# Patient Record
Sex: MALE | Race: WHITE | URBAN - METROPOLITAN AREA
[De-identification: names, ages, dates, MRNs, and addresses within clinical notes are randomized per-mention and may not be internally consistent; named-entity substitution may affect disease eponyms.]

---

## 2018-01-01 ENCOUNTER — DISCHARGE SUMMARY NURSING HOME (OUTPATIENT)
Dept: GERIATRICS | Facility: CLINIC | Age: 83
End: 2018-01-01
Payer: MEDICARE

## 2018-01-01 ENCOUNTER — TRANSFERRED RECORDS (OUTPATIENT)
Dept: HEALTH INFORMATION MANAGEMENT | Facility: CLINIC | Age: 83
End: 2018-01-01

## 2018-01-01 ENCOUNTER — NURSING HOME VISIT (OUTPATIENT)
Dept: GERIATRICS | Facility: CLINIC | Age: 83
End: 2018-01-01
Payer: MEDICARE

## 2018-01-01 VITALS
WEIGHT: 139.6 LBS | SYSTOLIC BLOOD PRESSURE: 94 MMHG | TEMPERATURE: 97.1 F | BODY MASS INDEX: 21.23 KG/M2 | OXYGEN SATURATION: 98 % | DIASTOLIC BLOOD PRESSURE: 56 MMHG | HEART RATE: 82 BPM

## 2018-01-01 VITALS
HEIGHT: 68 IN | HEART RATE: 78 BPM | DIASTOLIC BLOOD PRESSURE: 70 MMHG | OXYGEN SATURATION: 96 % | WEIGHT: 135.6 LBS | RESPIRATION RATE: 24 BRPM | BODY MASS INDEX: 20.55 KG/M2 | SYSTOLIC BLOOD PRESSURE: 109 MMHG | TEMPERATURE: 95.9 F

## 2018-01-01 VITALS
OXYGEN SATURATION: 98 % | TEMPERATURE: 97.1 F | WEIGHT: 138.8 LBS | SYSTOLIC BLOOD PRESSURE: 94 MMHG | HEART RATE: 82 BPM | DIASTOLIC BLOOD PRESSURE: 56 MMHG | BODY MASS INDEX: 21.1 KG/M2

## 2018-01-01 VITALS
TEMPERATURE: 97.6 F | BODY MASS INDEX: 19.73 KG/M2 | HEIGHT: 68 IN | WEIGHT: 130.2 LBS | RESPIRATION RATE: 18 BRPM | OXYGEN SATURATION: 95 % | DIASTOLIC BLOOD PRESSURE: 66 MMHG | SYSTOLIC BLOOD PRESSURE: 116 MMHG | HEART RATE: 89 BPM

## 2018-01-01 VITALS
DIASTOLIC BLOOD PRESSURE: 52 MMHG | SYSTOLIC BLOOD PRESSURE: 104 MMHG | TEMPERATURE: 97 F | RESPIRATION RATE: 24 BRPM | WEIGHT: 142 LBS | HEIGHT: 68 IN | HEART RATE: 83 BPM | BODY MASS INDEX: 21.52 KG/M2 | OXYGEN SATURATION: 97 %

## 2018-01-01 VITALS
DIASTOLIC BLOOD PRESSURE: 64 MMHG | OXYGEN SATURATION: 98 % | HEART RATE: 91 BPM | SYSTOLIC BLOOD PRESSURE: 112 MMHG | WEIGHT: 134.6 LBS | RESPIRATION RATE: 20 BRPM | BODY MASS INDEX: 20.47 KG/M2 | TEMPERATURE: 97.6 F

## 2018-01-01 VITALS
WEIGHT: 138 LBS | HEIGHT: 68 IN | BODY MASS INDEX: 20.92 KG/M2 | RESPIRATION RATE: 30 BRPM | TEMPERATURE: 96.1 F | DIASTOLIC BLOOD PRESSURE: 69 MMHG | OXYGEN SATURATION: 98 % | HEART RATE: 86 BPM | SYSTOLIC BLOOD PRESSURE: 112 MMHG

## 2018-01-01 VITALS
WEIGHT: 138.4 LBS | OXYGEN SATURATION: 96 % | HEIGHT: 68 IN | DIASTOLIC BLOOD PRESSURE: 70 MMHG | HEART RATE: 78 BPM | BODY MASS INDEX: 20.98 KG/M2 | SYSTOLIC BLOOD PRESSURE: 109 MMHG | TEMPERATURE: 95.9 F | RESPIRATION RATE: 24 BRPM

## 2018-01-01 VITALS
RESPIRATION RATE: 18 BRPM | BODY MASS INDEX: 20.98 KG/M2 | DIASTOLIC BLOOD PRESSURE: 60 MMHG | TEMPERATURE: 95.9 F | OXYGEN SATURATION: 97 % | HEIGHT: 68 IN | WEIGHT: 138.4 LBS | HEART RATE: 84 BPM | SYSTOLIC BLOOD PRESSURE: 102 MMHG

## 2018-01-01 DIAGNOSIS — I50.23 ACUTE ON CHRONIC SYSTOLIC CONGESTIVE HEART FAILURE (H): ICD-10-CM

## 2018-01-01 DIAGNOSIS — I48.0 PAROXYSMAL ATRIAL FIBRILLATION (H): ICD-10-CM

## 2018-01-01 DIAGNOSIS — Z99.2 ESRD (END STAGE RENAL DISEASE) ON DIALYSIS (H): ICD-10-CM

## 2018-01-01 DIAGNOSIS — R19.7 DIARRHEA, UNSPECIFIED TYPE: ICD-10-CM

## 2018-01-01 DIAGNOSIS — J43.9 PULMONARY EMPHYSEMA, UNSPECIFIED EMPHYSEMA TYPE (H): ICD-10-CM

## 2018-01-01 DIAGNOSIS — I10 BENIGN ESSENTIAL HTN: ICD-10-CM

## 2018-01-01 DIAGNOSIS — B33.8 RSV (RESPIRATORY SYNCYTIAL VIRUS INFECTION): ICD-10-CM

## 2018-01-01 DIAGNOSIS — I48.91 ATRIAL FIBRILLATION AND FLUTTER (H): ICD-10-CM

## 2018-01-01 DIAGNOSIS — B33.8 RSV INFECTION: ICD-10-CM

## 2018-01-01 DIAGNOSIS — M45.0 ANKYLOSING SPONDYLITIS OF MULTIPLE SITES IN SPINE (H): ICD-10-CM

## 2018-01-01 DIAGNOSIS — N18.6 ESRD (END STAGE RENAL DISEASE) ON DIALYSIS (H): ICD-10-CM

## 2018-01-01 DIAGNOSIS — I48.0 PAROXYSMAL ATRIAL FIBRILLATION (H): Primary | ICD-10-CM

## 2018-01-01 DIAGNOSIS — Z79.01 ENCOUNTER FOR MONITORING COUMADIN THERAPY: ICD-10-CM

## 2018-01-01 DIAGNOSIS — J18.9 PNEUMONIA OF RIGHT LOWER LOBE DUE TO INFECTIOUS ORGANISM: Primary | ICD-10-CM
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
## 2018-01-01 DIAGNOSIS — J96.01 ACUTE RESPIRATORY FAILURE WITH HYPOXIA (H): Primary | ICD-10-CM

## 2018-01-01 DIAGNOSIS — I10 ESSENTIAL HYPERTENSION: ICD-10-CM

## 2018-01-01 DIAGNOSIS — J44.1 COPD EXACERBATION (H): ICD-10-CM

## 2018-01-01 DIAGNOSIS — I73.9 PVD (PERIPHERAL VASCULAR DISEASE) (H): ICD-10-CM

## 2018-01-01 DIAGNOSIS — Z79.01 CHRONIC ANTICOAGULATION: ICD-10-CM

## 2018-01-01 DIAGNOSIS — R41.0 DELIRIUM: ICD-10-CM

## 2018-01-01 DIAGNOSIS — J18.9 PNEUMONIA OF RIGHT LOWER LOBE DUE TO INFECTIOUS ORGANISM: ICD-10-CM

## 2018-01-01 DIAGNOSIS — Z51.81 ENCOUNTER FOR MONITORING COUMADIN THERAPY: ICD-10-CM

## 2018-01-01 DIAGNOSIS — J96.01 ACUTE RESPIRATORY FAILURE WITH HYPOXIA (H): ICD-10-CM

## 2018-01-01 DIAGNOSIS — R53.81 PHYSICAL DECONDITIONING: ICD-10-CM

## 2018-01-01 DIAGNOSIS — T17.900D SILENT ASPIRATION, SUBSEQUENT ENCOUNTER: ICD-10-CM

## 2018-01-01 DIAGNOSIS — I50.22 CHRONIC SYSTOLIC CONGESTIVE HEART FAILURE (H): ICD-10-CM

## 2018-01-01 DIAGNOSIS — I48.92 ATRIAL FIBRILLATION AND FLUTTER (H): ICD-10-CM

## 2018-01-01 DIAGNOSIS — Z71.89 ADVANCED DIRECTIVES, COUNSELING/DISCUSSION: ICD-10-CM

## 2018-01-01 DIAGNOSIS — R53.81 PHYSICAL DECONDITIONING: Primary | ICD-10-CM

## 2018-01-01 LAB
ANION GAP SERPL CALCULATED.3IONS-SCNC: 11 MMOL/L (ref 5–15)
BUN SERPL-MCNC: 66 MG/DL (ref 8–26)
CALCIUM SERPL-MCNC: 9.5 MG/DL (ref 8.5–10.1)
CHLORIDE SERPLBLD-SCNC: 99 MMOL/L (ref 98–107)
CO2 SERPL-SCNC: 24 MMOL/L (ref 21–32)
CREAT SERPL-MCNC: 5.4 MG/DL (ref 0.7–1.2)
DIFFERENTIAL: ABNORMAL
ERYTHROCYTE [DISTWIDTH] IN BLOOD BY AUTOMATED COUNT: 18.2 % (ref 11.5–14.5)
GFR SERPL CREATININE-BSD FRML MDRD: 10 ML/MIN/1.73M2
GLUCOSE SERPL-MCNC: 98 MG/DL (ref 70–100)
HCT VFR BLD AUTO: 38.7 % (ref 41–54)
HEMOGLOBIN: 12.8 G/DL (ref 13.5–17.9)
MCH RBC QN AUTO: 32.2 PG (ref 27–33)
MCHC RBC AUTO-ENTMCNC: 33.1 G/DL (ref 32–37.5)
MCV RBC AUTO: 97.2 FL (ref 80–100)
PLATELET # BLD AUTO: 116 K/CMM (ref 150–400)
POTASSIUM SERPL-SCNC: 5.8 MMOL/L (ref 3.5–5)
RBC # BLD AUTO: 3.98 M/CMM (ref 4.6–6.2)
SODIUM SERPL-SCNC: 134 MMOL/L (ref 136–145)
WBC # BLD AUTO: 11.45 K/CMM (ref 4–11)

## 2018-01-01 PROCEDURE — 99308 SBSQ NF CARE LOW MDM 20: CPT | Performed by: NURSE PRACTITIONER

## 2018-01-01 PROCEDURE — 99310 SBSQ NF CARE HIGH MDM 45: CPT | Performed by: NURSE PRACTITIONER

## 2018-01-01 PROCEDURE — 99316 NF DSCHRG MGMT 30 MIN+: CPT | Performed by: NURSE PRACTITIONER

## 2018-01-01 PROCEDURE — 99309 SBSQ NF CARE MODERATE MDM 30: CPT | Performed by: NURSE PRACTITIONER

## 2018-01-01 PROCEDURE — 99306 1ST NF CARE HIGH MDM 50: CPT | Performed by: INTERNAL MEDICINE

## 2018-01-01 RX ORDER — POLYETHYLENE GLYCOL 3350 17 G/17G
1 POWDER, FOR SOLUTION ORAL DAILY PRN
COMMUNITY
End: 2018-01-01

## 2018-01-01 RX ORDER — LOPERAMIDE HCL 2 MG
2 CAPSULE ORAL 4 TIMES DAILY PRN
COMMUNITY
End: 2018-01-01

## 2018-01-01 RX ORDER — FLUTICASONE PROPIONATE 50 MCG
1 SPRAY, SUSPENSION (ML) NASAL DAILY
COMMUNITY

## 2018-01-01 RX ORDER — SEVELAMER CARBONATE 800 MG/1
1600 TABLET, FILM COATED ORAL
COMMUNITY

## 2018-01-01 RX ORDER — ALBUTEROL SULFATE 0.83 MG/ML
1 SOLUTION RESPIRATORY (INHALATION) 3 TIMES DAILY
COMMUNITY
End: 2018-01-01

## 2018-01-01 RX ORDER — ALBUTEROL SULFATE 90 UG/1
2 AEROSOL, METERED RESPIRATORY (INHALATION) EVERY 4 HOURS PRN
COMMUNITY
End: 2018-01-01

## 2018-01-01 RX ORDER — IPRATROPIUM BROMIDE AND ALBUTEROL SULFATE 2.5; .5 MG/3ML; MG/3ML
1 SOLUTION RESPIRATORY (INHALATION) EVERY 4 HOURS PRN
COMMUNITY
End: 2018-01-01

## 2018-02-14 PROBLEM — I50.23 ACUTE ON CHRONIC SYSTOLIC CONGESTIVE HEART FAILURE (H): Status: ACTIVE | Noted: 2018-01-01

## 2018-02-14 PROBLEM — Z71.89 ADVANCED DIRECTIVES, COUNSELING/DISCUSSION: Status: ACTIVE | Noted: 2018-01-01

## 2018-02-14 PROBLEM — I48.0 PAROXYSMAL ATRIAL FIBRILLATION (H): Status: ACTIVE | Noted: 2018-01-01

## 2018-02-14 PROBLEM — Z99.2 ESRD (END STAGE RENAL DISEASE) ON DIALYSIS (H): Status: ACTIVE | Noted: 2018-01-01
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
## 2018-02-14 PROBLEM — R41.0 DELIRIUM: Status: ACTIVE | Noted: 2018-01-01

## 2018-02-14 PROBLEM — R19.7 DIARRHEA: Status: ACTIVE | Noted: 2018-01-01

## 2018-02-14 PROBLEM — R53.81 PHYSICAL DECONDITIONING: Status: ACTIVE | Noted: 2018-01-01

## 2018-02-14 PROBLEM — J96.01 ACUTE RESPIRATORY FAILURE WITH HYPOXIA (H): Status: ACTIVE | Noted: 2018-01-01

## 2018-02-14 PROBLEM — J44.1 COPD EXACERBATION (H): Status: ACTIVE | Noted: 2018-01-01

## 2018-02-14 PROBLEM — B33.8 RSV (RESPIRATORY SYNCYTIAL VIRUS INFECTION): Status: ACTIVE | Noted: 2018-01-01

## 2018-02-14 PROBLEM — J18.9 PNEUMONIA OF RIGHT LOWER LOBE DUE TO INFECTIOUS ORGANISM: Status: ACTIVE | Noted: 2018-01-01

## 2018-02-14 PROBLEM — N18.6 ESRD (END STAGE RENAL DISEASE) ON DIALYSIS (H): Status: ACTIVE | Noted: 2018-01-01

## 2018-02-14 NOTE — PROGRESS NOTES
[place] [facility]  PRIMARY CARE PROVIDER AND CLINIC:  [facility]  Chief Complaint   Patient presents with     Hospital F/U       HPI:    Amado Galarza is a 82 year old  (1935),admitted to the [facility] from [facility].  Hospital stay 02/01/2018 through 02/13/2018.  Admitted to this facility for  rehab, medical management and nursing care.  HPI information obtained from: facility chart records, facility staff, patient report, family/first contact friend Arcelia Solison report and records from [facility].    He has a complex medical history including ischemic cardiomyopathy, history of CABG, CHF with EF 15-20%, ESRD on dialysis, COPD, HTN and was hospitalized with worsening shortness of breath and productive cough. He was positive for RSV. CT showed RLL consolidation and bilateral pleural effusions, R>L,  small to moderate abdominal ascites. He was treated with zosyn and levaquin, completed inpatient. Received 5 day course of prednisone for COPD exacerbation. CHF exacerbation was managed with dialysis. LE edema and dyspnea improved.  He developed diarrhea,which improved with psyllium.  Unclear if he was tested for C diff. He had acute delirium with hallucinations, improved with trazodone at HS.     Current issues are:         Acute respiratory failure with hypoxia (H)-reports fatigue and shortness of breath with activity, worse than usual. Continues with occasional dry cough, which is improving. Afebrile. O2 sats >90% on room air.   Pneumonia of right lower lobe due to infectious organism (H)  COPD exacerbation (H)  RSV (respiratory syncytial virus infection)  Acute on chronic systolic congestive heart failure (H)-LE edema improved. Friend reports he is scheduled for pacemaker placement next month, but expects this to be postponed.   Paroxysmal atrial fibrillation (H)-HR: 71-89. INR 2.5 today. Coumadin 1 mg was ordered for last night but not given, apparently because patient  was sleeping.   ESRD (end stage renal disease) on dialysis (H)-has been on dialysis since 2016.   PVD (peripheral vascular disease) (H)-hospitalized last month with LLE cellulitis, resolved.   Ankylosing spondylitis of multiple sites in spine (H)-friend reports history of chronic neck and back pain, has had fusion in the past. Patient denies pain at present.   Benign essential HTN-BPs:  107/66, 116/66, 111/67  Diarrhea, unspecified type-denies ongoing diarrhea  Fall, subsequent encounter-he fell prior to hospital discharge with no injury  Delirium- he initially responded to questions appropriately, but then repeatedly fell asleep and became confused. Speech difficult to understand at times.   Per friend, he is far from his baseline. He's usually cognitively intact and independent with cares, continues to drive.   Physical deconditioning-up with assist. Requires assist with all cares, including meals.     CODE STATUS/ADVANCE DIRECTIVES DISCUSSION:   DNR/DNI  Patient's living condition: lives alone in a condo. 2 children live out of state. Son in [place] is emergency contact.     ALLERGIES:Felodipine and Heparin  PAST MEDICAL HISTORY:  has a past medical history of Anemia; Ankylosing spondylitis (H); Ascites; Atrial flutter (H); Benign essential HTN; Bilateral sensorineural hearing loss; Colon polyp; Congestive heart failure (CHF) (H); COPD (chronic obstructive pulmonary disease) (H); Ectropion; ESRF (end stage renal failure) (H); Gout; Heart failure (H); Hyperlipidemia; Hyperparathyroidism (H); Iron deficiency anemia; Obesity; Osteoarthritis, knee; Peripheral vascular disease (H); Renal artery obstruction (H); and Vertigo.  PAST SURGICAL HISTORY:  has no past surgical history on file.  FAMILY HISTORY: family history is not on file.  SOCIAL HISTORY:  reports that he has quit smoking. He does not have any smokeless tobacco history on file. He reports that he does not drink alcohol or use illicit drugs.    Post Discharge  "Medication Reconciliation Status: discharge medications reconciled, continue medications without change.  Current Outpatient Prescriptions   Medication Sig Dispense Refill     albuterol (PROAIR HFA/PROVENTIL HFA/VENTOLIN HFA) 108 (90 BASE) MCG/ACT Inhaler Inhale 2 puffs into the lungs every 4 hours as needed for shortness of breath / dyspnea or wheezing       ipratropium - albuterol 0.5 mg/2.5 mg/3 mL (DUONEB) 0.5-2.5 (3) MG/3ML neb solution Take 1 vial by nebulization every 4 hours as needed for shortness of breath / dyspnea or wheezing       ALLOPURINOL PO Take 50 mg by mouth daily       cyanocobalamin 1000 MCG TABS Take 1 tablet by mouth At Bedtime       B Complex-C-Folic Acid (DIALYVITE PO) Take 1 tablet by mouth At Bedtime       fluticasone (FLONASE) 50 MCG/ACT spray Spray 1 spray into both nostrils daily       guaiFENesin (ROBITUSSIN) 20 mg/mL SOLN solution Take 200 mg by mouth every 4 hours as needed for cough       ISOSORBIDE MONONITRATE PO Take 30 mg by mouth At Bedtime       MELATONIN PO Take 3 mg by mouth At Bedtime       METOPROLOL SUCCINATE ER PO Take 12.5 mg by mouth daily       mometasone (ASMANEX) 220 MCG/INH Inhaler Inhale 2 puffs into the lungs 2 times daily       OMEPRAZOLE PO Take 20 mg by mouth daily       polyethylene glycol (MIRALAX/GLYCOLAX) powder Take 1 capful by mouth daily as needed for constipation       psyllium (METAMUCIL/KONSYL) Packet Take 1 packet by mouth daily       sevelamer (RENVELA) 800 MG tablet Take 1,600 mg by mouth 3 times daily (with meals)       TRAZODONE HCL PO Take 50 mg by mouth nightly as needed        Warfarin Sodium (COUMADIN PO) Take 1 mg by mouth daily          ROS:  Limited due to cognitive impairment. Responds to some questions appropriately, falls asleep during interview.     Exam:  /66  Pulse 89  Temp 97.6  F (36.4  C)  Resp 18  Ht 5' 8\" (1.727 m)  Wt 130 lb 3.2 oz (59.1 kg)  SpO2 95%  BMI 19.8 kg/m2  GENERAL APPEARANCE:  in no distress, " sleepy  ENT:  Mouth and posterior oropharynx normal, moist mucous membranes, [place]  EYES:  EOM normal, conjunctiva and lids normal, PERRL  NECK:  No adenopathy,masses or thyromegaly  RESP:  respiratory effort and palpation of chest normal, no respiratory distress, diminished breath sounds bilaterally, no crackles or wheezes  CV:  Palpation and auscultation of heart done , irregular rate and rhythm,  2/6 murmur, 1+ bilateral LE edema,  feet cool with purplish toes, unable to palpate pedal pulses  ABDOMEN:  normal bowel sounds, soft, nontender, no hepatosplenomegaly or other masses  M/S:   sitting up in chair. JUNG with good strength. No joint inflammation  SKIN:  toenails thick, long and discolored. No rashes or open areas. Dialysis catheter right chest site asymptomatic.   PSYCH:  oriented to self, situation, friend, insight and judgement impaired, memory impaired     Lab/Diagnostic data:  [facility] labs:  2/11/2018  Glucose 99  BUN  58  Creatinine 4.5  Sodium 132  Potassium 132  Chloride 95  CO2  22  Calcium 9.4  Magnesium 1.9  Anion Gap 15    2/8/2018  Sodium 134  Potassium 5.0  Chloride 97  CO2  23  BUN  39  Creatinine 3.1  Glucose 88  WBC  10.52  HGB  12.6  HCT  38.1  PLT  126    ASSESSMENT / PLAN:  (J96.01) Acute respiratory failure with hypoxia (H)  (primary encounter diagnosis)  (J18.1) Pneumonia of right lower lobe due to infectious organism (H)  (J44.1) COPD exacerbation (H)  (B97.4) RSV (respiratory syncytial virus infection)  Comment: improving slowly  Plan: continue nebs, albuterol inhaler prn, guaifenesin, mometasone. Monitor respiratory status.     (I50.23) Acute on chronic systolic congestive heart failure (H)  Comment: mild LE edema  Plan: compression stockings during the day. Daily weight. Dialysis for volume management. Cardiology follow up as scheduled.     (I48.0) Paroxysmal atrial fibrillation (H)  Comment: rate controlled. INR therapeutic. Missed dose coumadin last night  Plan: coumadin 1  mg daily. INR 2/16/2018    (N18.6,  Z99.2) ESRD (end stage renal disease) on dialysis (H)  Comment: no acute issues  [facility] Continue Renvela, dialyvite. Renal diet.     (I73.9) PVD (peripheral vascular disease) (H)  Comment: chronic. Needs nail care  Plan: refer to onsite Podiatry. If not available, [facility]iatry at [facility]    (M45.0) Ankylosing spondylitis of multiple sites in spine (H)  Comment: chronic. Pain appears controlled  Plan: tylenol prn    (I10) Benign essential HTN  Comment: controlled  Plan: continue metoprolol, isosorbide. Monitor VS    (R19.7) Diarrhea, unspecified type  Comment: appears resolved  Plan: if diarrhea recurs, obtain stool specimen for  C diff    (W19.XXXD) Fall, subsequent encounter  Comment: fell inpatient. No apparent injury  Plan: fall precautions.     (R41.0) Delirium  Comment: ongoing delirium.   Plan: SPEECH THERAPY eval and treat for swallow and cognition. Supportive care. Closely monitor mental status.     (Z71.89) Advanced directives, counseling/discussion  Comment: he has a directive on file and records indicate DNR/DNI. This is confirmed by patient and friend.   Plan: POLST completed and epic orders updated    (R53.81) Physical deconditioning  Comment: very deconditioned due to acute illness and hospitalization  Plan: PHYSICAL THERAPY/OT. Goal is to return home with services.       Electronically signed by:  TONI Mckinney CNP

## 2018-02-16 NOTE — PROGRESS NOTES
PRIMARY CARE PROVIDER AND CLINIC RESPONSIBLE:  System, Provider Not In,          ADMISSION HISTORY AND PHYSICAL EXAMINATION     Chief Complaint   Patient presents with     Fatigue         HISTORY OF PRESENT ILLNESS:  82 year old male, (1935), admitted [facility] the [facility] for continuation of medical care and rehab.  HPI information obtained from: facility chart records, facility staff, patient report, [facility] chart review and [facility] admissiona and discharge notes.    Amado Galarza is a 82 year old male with history of ankylosing spondylitis,  atrial fib/flutter s/p ablation and warfarin therapy, hypertension,  bilateral sensorineural hearing loss, colon polyp, congestive heart failure (CHF), COPD (chronic obstructive pulmonary disease), ESRD (end stage renal failure),  gout, skin cancer resected at nose, hyperlipidemia; secondary hyperparathyroidism, Iron deficiency anemia who was admitted at [facility] from 2/1/18 to 2/13/18 due to worsening dyspnea, cough. Chest x-ray showed RLL pneumonia. Influenza A and B PCR were negative but was positive RSV. He was treated with IV Levaquin and Zosyn and narrowed down to Levaquin. He was treated prednisone for COPD. His lung condition improved after run of dialysis. He had hospital acquired delirium, improved with Trazodone will helped his sleep. He has hard hearing and neck issue related to spondylitis. He has nose surgical wound related to skin cancer removed at that area for days ago. His cough decreased and no phlegm. He has usual dyspnea. He feels tired and tired. He is not confused this morning. Slept well, appetite poor and loose stool diarrhea, c difficile result pending. Patient denies chest pain, abdominal pain, n&v, fever, chills, dysuria, leg pain or swelling. The rest of ROS is unremarkable. Patient is seen and examined by me with Ajay Lemus NP. Please see ZACHARY Lemus's admit noted dated 2/14/18 for details of admission, past medical history, family  history, allergies, medication list, social history and other details pertinent with this admission. Hospital admission and dc summary reviewed.    Past Medical History:   Diagnosis Date     Anemia      Ankylosing spondylitis (H)      Ascites      Atrial flutter (H)      Benign essential HTN      Bilateral sensorineural hearing loss      Colon polyp      Congestive heart failure (CHF) (H)      COPD (chronic obstructive pulmonary disease) (H)      Ectropion      ESRF (end stage renal failure) (H)      Gout      Heart failure (H)      Hyperlipidemia      Hyperparathyroidism (H)      Iron deficiency anemia      Obesity      Osteoarthritis, knee      Peripheral vascular disease (H)      Renal artery obstruction (H)      Vertigo        No past surgical history on file.    Current Outpatient Prescriptions   Medication Sig     Olodaterol HCl 2.5 MCG/ACT AERS Inhale 2 puffs into the lungs every morning     tiotropium-olodaterol (STIOLTO RESPIMAT) 2.5-2.5 MCG/ACT AERS Inhale 2 puffs into the lungs daily     albuterol (PROAIR HFA/PROVENTIL HFA/VENTOLIN HFA) 108 (90 BASE) MCG/ACT Inhaler Inhale 2 puffs into the lungs every 4 hours as needed for shortness of breath / dyspnea or wheezing     ALLOPURINOL PO Take 50 mg by mouth daily     cyanocobalamin 1000 MCG TABS Take 1 tablet by mouth At Bedtime     B Complex-C-Folic Acid (DIALYVITE PO) Take 1 tablet by mouth At Bedtime     fluticasone (FLONASE) 50 MCG/ACT spray Spray 1 spray into both nostrils daily     guaiFENesin (ROBITUSSIN) 20 mg/mL SOLN solution Take 200 mg by mouth every 4 hours as needed for cough     ISOSORBIDE MONONITRATE PO Take 30 mg by mouth At Bedtime     MELATONIN PO Take 3 mg by mouth At Bedtime     METOPROLOL SUCCINATE ER PO Take 12.5 mg by mouth daily     mometasone (ASMANEX) 220 MCG/INH Inhaler Inhale 2 puffs into the lungs 2 times daily     OMEPRAZOLE PO Take 20 mg by mouth daily     polyethylene glycol (MIRALAX/GLYCOLAX) powder Take 1 capful by mouth daily  "as needed for constipation     psyllium (METAMUCIL/KONSYL) Packet Take 1 packet by mouth daily     sevelamer (RENVELA) 800 MG tablet Take 1,600 mg by mouth 3 times daily (with meals)     TRAZODONE HCL PO Take 50 mg by mouth nightly as needed      Warfarin Sodium (COUMADIN PO) Take 1 mg by mouth daily      No current facility-administered medications for this visit.        Allergies   Allergen Reactions     Felodipine      Heparin        Social History     Social History     Marital status: Single     Spouse name: N/A     Number of children: N/A     Years of education: N/A     Occupational History     Not on file.     Social History Main Topics     Smoking status: Former Smoker     Smokeless tobacco: Not on file      Comment: quit 40+ years ago     Alcohol use No     Drug use: No     Sexual activity: Not on file     Other Topics Concern     Not on file     Social History Narrative     No narrative on file          Information reviewed:  Medications, vital signs, orders, nursing notes, problem list, hospital information.     ROS: All 10 point review of system completed, those pertinent positive, please see H&P, the remaining ROS is negative.    /69  Pulse 86  Temp 96.1  F (35.6  C)  Resp 30  Ht 5' 8\" (1.727 m)  Wt 138 lb (62.6 kg)  SpO2 98%  BMI 20.98 kg/m2    PHYSICAL EXAMINATION:   GENERAL:  No acute distress.  SKIN:  Dry and warm.  There is small wound at nose.  HEENT:  Head without trauma.  Pupils round, reactive. Exam of conjunctiva and lids are normal. Sclera without icterus. There is no oral thrush. As above  NECK:  Supple. No jugular venous distension.  CHEST: No reproducible chest tenderness.   LUNGS:  Normal respiratory effort. Lungs reveal decreased breath sounds at bases. No rales or wheezes.  HEART:  Regular rate and rhythm.  No murmur, gallops or rubs auscultated.  ABDOMEN:  Soft, bowel sounds positive.  There is no tenderness or guarding.   EXTREMITIES: No edema.   NEUROLOGIC:  Alert and " oriented x3. Moving all ext. Gait not tested.  PSYCH: Recent and remote memory is normal. Mood and affect are normal.    Lab/Diagnostic data:  Reviewed  [facility] labs:  2/11/2018  Glucose                     99  BUN                            58  Creatinine                  4.5  Sodium                      132  Potassium                 132  Chloride                     95  CO2                          22  Calcium                     9.4  Magnesium               1.9  Anion Gap                 15     2/8/2018  Sodium                      134  Potassium                 5.0  Chloride                     97  CO2                           23  BUN                           39  Creatinine                  3.1  Glucose                     88  WBC                          10.52  HGB                            12.6  HCT                             38.1  PLT                              126    ASSESSMENT / PLAN:     Physical deconditioning  Plan: PT/OT with increase independence, self-care, strength and endurance and other cares that help return to home/facility of origin, fall precautions. Care conference with patient and family for the progress of rehab and disposition issues will be discussed as planned. Rehab evaluation and other evaluations including CPT are at rehab logs, to be reviewed separately.  Fall risk assessment as well as cognitive evaluation will be formed during rehab stay if indicated.    RSV infection  Plan: improving. Will dc aerosol isolation.    Atrial fibrillation and flutter (H)  Plan: Continue metoprolol and warfarin. Monitor INR.    Chronic systolic congestive heart failure (H), CAD and Essential hypertension  Plan: continue current medications metoprolol, imdur, monitor I&O and daily weighs and labs if indicated.    ESRD (end stage renal disease) on dialysis (H)  Plan: Continue dialysis M-W-F. Follow up nephrologist as scheduled.    Pulmonary emphysema, unspecified emphysema type (H)  Plan:  Continue tiotropium-olodaterol and Asmanex.    Diarrhea, unspecified type  Plan: Continue to monitor, await c difficile toxin.     Other problems with same care. Primary care doctor and other specialists to address those chronic problems in next clinic appointment to be scheduled upon discharge from the TCU.      Total time spent with patient visit was 37 min including patient visit, review of past records, patients counseling and coordinating care.        Hernan Delgado MD

## 2018-02-19 NOTE — PROGRESS NOTES
[facility]    Chief Complaint   Patient presents with     RECHECK       HPI:    Amado Galarza is a 82 year old  (1935), who is being seen today for an episodic care visit at [place] [facility].  HPI information obtained from: facility chart records, facility staff, patient report, family/first contact friend Arcelia Seth report and records from [facility].    He came to this facility 2/13/2018 for short term rehab and medical management following hospitalization for worsening shortness of breath and productive cough.   He has a complex medical history including ischemic cardiomyopathy, history of CABG, CHF with EF 15-20%, ESRD on dialysis, COPD, HTN.    He was positive for RSV. CT showed RLL consolidation and bilateral pleural effusions, R>L,  small to moderate abdominal ascites. He was treated with zosyn and levaquin, completed inpatient. Received 5 day course of prednisone for COPD exacerbation. CHF exacerbation was managed with dialysis. LE edema and dyspnea improved. He had acute delirium with hallucinations, improved with trazodone at HS.     Current issues are:         Pneumonia of right lower lobe due to infectious organism (H)-afebrile, no hypoxia. Reports increased coughing over the past 2 days, sometimes productive for yellow sputum. Denies shortness of breath or chest pain. Much more alert and coherent. Friend reports he is closer to his baseline cognition. Still  weak and easily fatigued.   COPD exacerbation (H)  RSV (respiratory syncytial virus infection)  Acute on chronic systolic congestive heart failure (H)-recorded weight is up 8 lbs today. LE edema has resolved. Due for dialysis today.   Paroxysmal atrial fibrillation (H)-HR: 63-96. INR 2.8 today on 2 mg daily. Last INR 1.9. No bleeding.   ESRD (end stage renal disease) on dialysis (H)-denies problems with dialysis.   PVD (peripheral vascular disease) (H)-was unable to be seen by the onsite Podiatrist for long, discolored  toenails.   Benign essential HTN-BPs: 102/60, 101/63, 141/56     Diarrhea, unspecified type-had diarrhea inpatient that improved, but has worsened since tcu admission. Had 3 BMs today. C diff obtained and is negative.  Denies nausea, vomiting or abdominal pain.   Physical deconditioning-ambulating short distances with walker and assist. Requires assist of 1 with cares.      ALLERGIES: Felodipine and Heparin  Past Medical, Surgical, Family and Social History reviewed and updated in EPIC.    Current Outpatient Prescriptions   Medication Sig Dispense Refill     albuterol (2.5 MG/3ML) 0.083% neb solution Take 1 vial by nebulization 3 times daily And every 4 hours as needed.       loperamide (IMODIUM) 2 MG capsule Take 2 mg by mouth 4 times daily as needed for diarrhea       Olodaterol HCl 2.5 MCG/ACT AERS Inhale 2 puffs into the lungs every morning       tiotropium-olodaterol (STIOLTO RESPIMAT) 2.5-2.5 MCG/ACT AERS Inhale 2 puffs into the lungs daily       ALLOPURINOL PO Take 50 mg by mouth daily       cyanocobalamin 1000 MCG TABS Take 1 tablet by mouth At Bedtime       B Complex-C-Folic Acid (DIALYVITE PO) Take 1 tablet by mouth At Bedtime       fluticasone (FLONASE) 50 MCG/ACT spray Spray 1 spray into both nostrils daily       guaiFENesin (ROBITUSSIN) 20 mg/mL SOLN solution Take 200 mg by mouth every 4 hours as needed for cough       ISOSORBIDE MONONITRATE PO Take 30 mg by mouth At Bedtime       MELATONIN PO Take 3 mg by mouth At Bedtime       METOPROLOL SUCCINATE ER PO Take 12.5 mg by mouth daily       mometasone (ASMANEX) 220 MCG/INH Inhaler Inhale 2 puffs into the lungs 2 times daily       OMEPRAZOLE PO Take 20 mg by mouth daily       psyllium (METAMUCIL/KONSYL) Packet Take 1 packet by mouth daily       sevelamer (RENVELA) 800 MG tablet Take 1,600 mg by mouth 3 times daily (with meals)       Warfarin Sodium (COUMADIN PO) Take 1-2 mg by mouth daily        albuterol (PROAIR HFA/PROVENTIL HFA/VENTOLIN HFA) 108 (90  "BASE) MCG/ACT Inhaler Inhale 2 puffs into the lungs every 4 hours as needed for shortness of breath / dyspnea or wheezing       Medications reviewed:  Medications reconciled to facility chart and changes were made to reflect current medications as identified as above med list. Below are the changes that were made:   Medications stopped since last EPIC medication reconciliation:   Medications Discontinued During This Encounter   Medication Reason     TRAZODONE HCL PO      polyethylene glycol (MIRALAX/GLYCOLAX) powder        Medications started since last [facility] medication reconciliation:  Orders Placed This Encounter   Medications     albuterol (2.5 MG/3ML) 0.083% neb solution     Sig: Take 1 vial by nebulization 3 times daily And every 4 hours as needed.       REVIEW OF SYSTEMS:  10 point ROS of systems including Constitutional, Eyes, Respiratory, Cardiovascular, Gastroenterology, Genitourinary, Integumentary, Muscularskeletal, Psychiatric were all negative except for pertinent positives noted in my HPI.    Physical Exam:  /60  Pulse 84  Temp 95.9  F (35.5  C)  Resp 18  Ht 5' 8\" (1.727 m)  Wt 138 lb 6.4 oz (62.8 kg)  SpO2 97%  BMI 21.04 kg/m2  GENERAL APPEARANCE: alert, in no distress  ENT:  Mouth and posterior oropharynx normal, moist mucous membranes, [place]  EYES:  EOM normal, conjunctiva and lids normal  NECK:  No adenopathy,masses or thyromegaly  RESP:  respiratory effort and palpation of chest normal, no respiratory distress, coarse breath sounds bilaterally, improved after coughing  CV:  Palpation and auscultation of heart done , irregular rate and rhythm,  2/6 murmur,no LE edema, feet cool with purplish toes, unable to palpate pedal pulses  ABDOMEN: soft, nontender, no hepatosplenomegaly or other masses  M/S:   sitting up in chair. JUNG with good strength. No joint inflammation  SKIN:   No rashes or open areas. Dialysis catheter right chest site asymptomatic.   PSYCH:  oriented X3,  memory impaired, " mood normal      Recent Labs:   [facility] labs:  2/11/2018  Glucose 99  BUN  58  Creatinine 4.5  Sodium 132  Potassium 132  Chloride 95  CO2  22  Calcium 9.4  Magnesium 1.9  Anion Gap 15    2/8/2018  Sodium 134  Potassium 5.0  Chloride 97  CO2  23  BUN  39  Creatinine 3.1  Glucose 88  WBC  10.52  HGB  12.6  HCT  38.1  PLT  126    ASSESSMENT / PLAN:  (J18.1) Pneumonia of right lower lobe due to infectious organism (H)  (primary encounter diagnosis)  (J44.1) COPD exacerbation (H)  (B97.4) RSV (respiratory syncytial virus infection)  Comment: continues with frequent cough.   Plan: CBC with diff, BMP. Schedule nebs. Encourage IS. Continue SPEECH THERAPY.     (I50.23) Acute on chronic systolic congestive heart failure (H)  Comment: weight up over the WE  Plan: dialysis MWF. Daily weight. Compression stockings during the day. Cardiology follow up as scheduled.     (I48.0) Paroxysmal atrial fibrillation (H)  Comment: rate controlled. INR therapeutic  Plan: coumadin 1-2 mg daily. INR 2/21/2018 . Continue metoprolol.     (N18.6,  Z99.2) ESRD (end stage renal disease) on dialysis (H)  Comment: no acute issues  [facility] Continue Renvela, dialyvite.     (I73.9) PVD (peripheral vascular disease) (H)  Comment: chronic. Skin intact, needs nail care  [facility]    (I10) Benign essential HTN  Comment: controlled  Plan: continue metoprolol, isosorbide. Monitor VS    (R19.7) Diarrhea, unspecified type  Comment: C diff negative  Plan: imodium prn and monitor    (R53.81) Physical deconditioning  Comment: progressing in therapies  Plan: continue PHYSICAL THERAPY/OT. Goal is to return home with services.         Electronically signed by  TONI Mckinney CNP

## 2018-02-21 NOTE — PROGRESS NOTES
[facility]    Chief Complaint   Patient presents with     RECHECK       HPI:    Amado Galarza is a 82 year old  (1935), who is being seen today for an episodic care visit at [place] [facility].  HPI information obtained from: facility chart records, facility staff, patient report, family/first contact friend Arcelia Seth report and records from [facility].    He came to this facility 2/13/2018 for short term rehab and medical management following hospitalization for worsening shortness of breath and productive cough.   He has a complex medical history including ischemic cardiomyopathy, history of CABG, CHF with EF 15-20%, ESRD on dialysis, COPD, HTN.    He was positive for RSV. CT showed RLL consolidation and bilateral pleural effusions, R>L,  small to moderate abdominal ascites. He was treated with zosyn and levaquin, completed inpatient. Received 5 day course of prednisone for COPD exacerbation. CHF exacerbation was managed with dialysis. LE edema and dyspnea improved. He had acute delirium with hallucinations, improved with trazodone at [facility].     Current issues are:         Pneumonia of right lower lobe due to infectious organism (H)-reports feeling better today and his friend agrees he's improving. Less coughing, no shortness of breath or chest pain. Afebrile, no hypoxia. They are unhappy with the .   RSV (respiratory syncytial virus infection)  COPD exacerbation (H)  Paroxysmal atrial fibrillation (H)-INR 2.7 today. No bleeding. HR: 78-84  Acute on chronic systolic congestive heart failure (H)  ESRD (end stage renal disease) on dialysis (H)-no issues with dialysis  Benign essential HTN-BPs: 109/70, 113/71, 102/60  Diarrhea, unspecified type-reports no recent diarrhea.   Physical deconditioning-ambulating 125 ft with walker and stand by assist. Requires stand by to min assist with cares.   Speech therapist reports swallow is normal and cognition intact, much improved from admission.      ALLERGIES: Felodipine and Heparin  Past Medical, Surgical, Family and Social History reviewed and updated in [facility].    Current Outpatient Prescriptions   Medication Sig Dispense Refill     albuterol (2.5 MG/3ML) 0.083% neb solution Take 1 vial by nebulization 3 times daily And every 4 hours as needed.       loperamide (IMODIUM) 2 MG capsule Take 2 mg by mouth 4 times daily as needed for diarrhea       Olodaterol HCl 2.5 MCG/ACT AERS Inhale 2 puffs into the lungs every morning       tiotropium-olodaterol (STIOLTO RESPIMAT) 2.5-2.5 MCG/ACT AERS Inhale 2 puffs into the lungs daily       ALLOPURINOL PO Take 50 mg by mouth daily       cyanocobalamin 1000 MCG TABS Take 1 tablet by mouth At Bedtime       B Complex-C-Folic Acid (DIALYVITE PO) Take 1 tablet by mouth At Bedtime       fluticasone (FLONASE) 50 MCG/ACT spray Spray 1 spray into both nostrils daily       guaiFENesin (ROBITUSSIN) 20 mg/mL SOLN solution Take 200 mg by mouth every 4 hours as needed for cough       ISOSORBIDE MONONITRATE PO Take 30 mg by mouth At Bedtime       MELATONIN PO Take 3 mg by mouth At Bedtime       METOPROLOL SUCCINATE ER PO Take 12.5 mg by mouth daily       mometasone (ASMANEX) 220 MCG/INH Inhaler Inhale 2 puffs into the lungs 2 times daily       OMEPRAZOLE PO Take 20 mg by mouth daily       psyllium (METAMUCIL/KONSYL) Packet Take 1 packet by mouth daily       sevelamer (RENVELA) 800 MG tablet Take 1,600 mg by mouth 3 times daily (with meals)       albuterol (PROAIR HFA/PROVENTIL HFA/VENTOLIN HFA) 108 (90 BASE) MCG/ACT Inhaler Inhale 2 puffs into the lungs every 4 hours as needed for shortness of breath / dyspnea or wheezing       Warfarin Sodium (COUMADIN PO) Take 1-2 mg by mouth daily        Medications reviewed:  Medications reconciled to facility chart and changes were made to reflect current medications as identified as above med list. Below are the changes that were made:   Medications stopped since last EPIC medication  "reconciliation:   There are no discontinued medications.    Medications started since last [facility] medication reconciliation:  No orders of the defined types were placed in this encounter.    REVIEW OF SYSTEMS:  10 point ROS of systems including Constitutional, Eyes, Respiratory, Cardiovascular, Gastroenterology, Genitourinary, Integumentary, Muscularskeletal, Psychiatric were all negative except for pertinent positives noted in my HPI.    Physical Exam:  /70  Pulse 78  Temp 95.9  F (35.5  C)  Resp 24  Ht 5' 8\" (1.727 m)  Wt 138 lb 6.4 oz (62.8 kg)  SpO2 96%  BMI 21.04 kg/m2  GENERAL APPEARANCE:  Alert, in no distress  ENT:  Mouth and posterior oropharynx normal, moist mucous membranes, [place]  EYES:  EOM normal, conjunctiva and lids normal  NECK:  No adenopathy,masses or thyromegaly  RESP:  respiratory effort and palpation of chest normal, no respiratory distress, clear to auscultation bilaterally, no wheezes or crackles  CV:  Palpation and auscultation of heart done , irregular rate and rhythm,  2/6 murmur,trace bilateral  LE edema, unable to palpate pedal pulses  ABDOMEN: soft, nontender, no hepatosplenomegaly or other masses  M/S:   sitting up in chair. JUNG with good strength. No joint inflammation  SKIN:  no rashes or open areas. Dialysis catheter right chest site asymptomatic.   PSYCH:  oriented X3, normal judgement and memory, mood normal     Recent Labs:   [facility] labs 2/19/2018:   WBC  11.45  RBC  3.98  HGB  12.8  HCT  38.7  MCV  97.2  MCH  32.2  MCHC  33.1  RDW  18.2  PLT  116    SODIUM 134  POTASSIUM 5.8  CHLORIDE 99  CO2  24  ANION GAP 11  GLUCOSE 98  BUN  66  CREATININE 5.4  CALCIUM 9.5  MAGNESIUM 1.7    ASSESSMENT / PLAN:  (J18.1) Pneumonia of right lower lobe due to infectious organism (H)  (primary encounter diagnosis)  (B97.4) RSV (respiratory syncytial virus infection)  (J44.1) COPD exacerbation (H)  Comment: improving, feeling better today  Plan: continue nebs, IS. Continue SPEECH " THERAPY. Dietician to consult re: food preferences.     (I48.0) Paroxysmal atrial fibrillation (H)  Comment: rate controlled. INR therapeutic  Plan: continue metoprolol. Continue coumadin alternating 1-2 mg daily. INR 2/23/2018    (I50.23) Acute on chronic systolic congestive heart failure (H)  Comment: fluid status improved  Plan: continue dialysis. Daily weight.     (N18.6,  Z99.2) ESRD (end stage renal disease) on dialysis (H)  Comment: chronic  [facility] Continue Renvela, dialyvite.     (I10) Benign essential HTN  Comment: controlled  Plan: continue metoprolol, isosorbide. Monitor VS    (R19.7) Diarrhea, unspecified type  Comment: improved. C diff negative  Plan: continue imodium prn    (R53.81) Physical deconditioning  Comment: slowly progressing in therapies  Plan: continue PHYSICAL THERAPY/OT. Goal is to return home with services.     Total time spent with patient visit at the skilled nursing facility was 38 mins including patient visit, review of past records and discussion with friend. Greater than 50% of total time spent with counseling and coordination of care due to complexity of care.     Electronically signed by  TONI Mckinney CNP

## 2018-02-23 PROBLEM — Z79.01 CHRONIC ANTICOAGULATION: Status: ACTIVE | Noted: 2018-01-01

## 2018-02-23 PROBLEM — Z51.81 ENCOUNTER FOR MONITORING COUMADIN THERAPY: Status: ACTIVE | Noted: 2018-01-01

## 2018-02-23 PROBLEM — Z79.01 ENCOUNTER FOR MONITORING COUMADIN THERAPY: Status: ACTIVE | Noted: 2018-01-01

## 2018-02-23 NOTE — PROGRESS NOTES
[place] [facility]    HPI:    Amado Galarza is a 82 year old  (1935), who is being seen today for an episodic care visit at [facility].   HPI information obtained from: facility chart records, facility staff, patient report and [facility] chart review. Today's concern is INR/Coumadin management for A. Fib    Bleeding Signs/Symptoms:  None  Thromboembolic Signs/Symptoms:  None    Medication Changes:  No  Dietary Changes:  No  Activity Changes: No  Bacterial/Viral Infection:  No    Missed Coumadin Doses:  None    On ASA: No    Other Concerns:  No    OBJECTIVE:    INR Today:  2.6  Current Dose:  Alternating 1-2 mg daily    ASSESSMENT:     Paroxysmal atrial fibrillation (H)  Chronic anticoagulation  Encounter for monitoring coumadin therapy  Therapeutic INR for goal of 2-3    PLAN:    New Dose: No Change      Next INR: 2/27/2018      Electronically signed by:  TONI Mckinney CNP

## 2018-03-02 NOTE — PROGRESS NOTES
"[facility]    Chief Complaint   Patient presents with     RECHECK       HPI:    Amado Galarza is a 82 year old  (1935), who is being seen today for an episodic care visit at [place] [facility].  HPI information obtained from: facility chart records, facility staff, patient report, family/first contact friend Arcelia Seth report and records from [facility].    He came to this facility 2/13/2018 for short term rehab and medical management following hospitalization for worsening shortness of breath and productive cough.   He has a complex medical history including ischemic cardiomyopathy, history of CABG, CHF with EF 15-20%, ESRD on dialysis, COPD, HTN.    He was positive for RSV. CT showed RLL consolidation and bilateral pleural effusions, R>L,  small to moderate abdominal ascites. He was treated with zosyn and levaquin, completed inpatient. Received 5 day course of prednisone for COPD exacerbation. CHF exacerbation was managed with dialysis. LE edema and dyspnea improved. He had acute delirium with hallucinations, improved with trazodone at [facility].     Current issues are:          Pneumonia of right lower lobe due to infectious organism (H)-onsite scope done by speech therapist earlier this week showed silent aspiration. Diet downgraded to regular foods with nectar this liquids. Reports he had a \"bad coughing episode\" last night. Afebrile. No hypoxia. Denies shortness of breath. Reports nebs are helpful. His friend Arcelia Seth is here and would like nebs to continue at home.   RSV (respiratory syncytial virus infection)  Silent aspiration, subsequent encounter  COPD exacerbation (H)  Paroxysmal atrial fibrillation (H)-INR 3.8 today on coumadin alternating 1-2 mg daily. Previous INR was 2.8. Doesn't think he's been eating as well the past few days. No bleeding.   Acute on chronic systolic congestive heart failure (H)-no edema  ESRD (end stage renal disease) on dialysis (H)  Benign essential HTN-BPs:  " 94/56, 109/62, 94/55   HR: 74-86  Diarrhea, unspecified type-reports this has improved after metamucil discontinued. C diff was negative.   Physical deconditioning-ambulating 150 ft with walker and stand by assist. Requires stand by to min assist with cares. There has been discussion about discharge, but he and his friend don't feel that he is ready for discharge and friend is concerned about home safety.     ALLERGIES: Felodipine and Heparin  Past Medical, Surgical, Family and Social History reviewed and updated in [facility].    Current Outpatient Prescriptions   Medication Sig Dispense Refill     EMOLLIENT EX Apply topically daily Apply to bilateral legs       albuterol (2.5 MG/3ML) 0.083% neb solution Take 1 vial by nebulization 3 times daily And every 4 hours as needed.       loperamide (IMODIUM) 2 MG capsule Take 2 mg by mouth 4 times daily as needed for diarrhea       Olodaterol HCl 2.5 MCG/ACT AERS Inhale 2 puffs into the lungs every morning       tiotropium-olodaterol (STIOLTO RESPIMAT) 2.5-2.5 MCG/ACT AERS Inhale 2 puffs into the lungs daily       ALLOPURINOL PO Take 50 mg by mouth daily       cyanocobalamin 1000 MCG TABS Take 1 tablet by mouth At Bedtime       B Complex-C-Folic Acid (DIALYVITE PO) Take 1 tablet by mouth At Bedtime       fluticasone (FLONASE) 50 MCG/ACT spray Spray 1 spray into both nostrils daily       guaiFENesin (ROBITUSSIN) 20 mg/mL SOLN solution Take 200 mg by mouth every 4 hours as needed for cough       ISOSORBIDE MONONITRATE PO Take 30 mg by mouth At Bedtime       MELATONIN PO Take 3 mg by mouth At Bedtime       METOPROLOL SUCCINATE ER PO Take 12.5 mg by mouth daily       mometasone (ASMANEX) 220 MCG/INH Inhaler Inhale 2 puffs into the lungs 2 times daily       OMEPRAZOLE PO Take 20 mg by mouth daily       sevelamer (RENVELA) 800 MG tablet Take 1,600 mg by mouth 3 times daily (with meals)       albuterol (PROAIR HFA/PROVENTIL HFA/VENTOLIN HFA) 108 (90 BASE) MCG/ACT Inhaler Inhale 2 puffs  into the lungs every 4 hours as needed for shortness of breath / dyspnea or wheezing       Warfarin Sodium (COUMADIN PO) Take 1-2 mg by mouth daily        Medications reviewed:  Medications reconciled to facility chart and changes were made to reflect current medications as identified as above med list. Below are the changes that were made:   Medications stopped since last EPIC medication reconciliation:   Medications Discontinued During This Encounter   Medication Reason     psyllium (METAMUCIL/KONSYL) Packet        Medications started since last [facility] medication reconciliation:  Orders Placed This Encounter   Medications     EMOLLIENT EX     Sig: Apply topically daily Apply to bilateral legs         REVIEW OF SYSTEMS:  10 point ROS of systems including Constitutional, Eyes, Respiratory, Cardiovascular, Gastroenterology, Genitourinary, Integumentary, Muscularskeletal, Psychiatric were all negative except for pertinent positives noted in my HPI.    Physical Exam:  BP 94/56  Pulse 82  Temp 97.1  F (36.2  C)  Wt 139 lb 9.6 oz (63.3 kg)  SpO2 98%  BMI 21.23 kg/m2    GENERAL APPEARANCE:  Alert, in no distress  ENT:  Mouth and posterior oropharynx normal, moist mucous membranes, [place]  EYES:  EOM normal, conjunctiva and lids normal  NECK:  No adenopathy,masses or thyromegaly  RESP:  respiratory effort and palpation of chest normal, no respiratory distress, clear to auscultation bilaterally, no wheezes or crackles  CV:  Palpation and auscultation of heart done , irregular rate and rhythm,  2/6 murmur, no  LE edema, unable to palpate pedal pulses  ABDOMEN: soft, nontender, no hepatosplenomegaly or other masses  M/S:  gait steady with walker.  JUNG with good strength. No joint inflammation  SKIN:  no rashes or open areas. Dialysis catheter right chest site asymptomatic.    PSYCH:  oriented X3, normal judgement and memory, mood normal     Recent Labs:   Last Basic Metabolic Panel:  Lab Results   Component Value Date    NA  134 02/19/2018      Lab Results   Component Value Date    POTASSIUM 5.8 02/19/2018     Lab Results   Component Value Date    CHLORIDE 99 02/19/2018     Lab Results   Component Value Date    CHRISTOPHE 9.5 02/19/2018     Lab Results   Component Value Date    CO2 24 02/19/2018     Lab Results   Component Value Date    BUN 66 02/19/2018     Lab Results   Component Value Date    CR 5.4 02/19/2018     Lab Results   Component Value Date    GLC 98 02/19/2018     Lab Results   Component Value Date    WBC 11.45 02/19/2018     Lab Results   Component Value Date    RBC 3.98 02/19/2018     Lab Results   Component Value Date    HGB 12.8 02/19/2018     Lab Results   Component Value Date    HCT 38.7 02/19/2018     Lab Results   Component Value Date    MCV 97.2 02/19/2018     Lab Results   Component Value Date    MCH 32.2 02/19/2018     Lab Results   Component Value Date    MCHC 33.1 02/19/2018     Lab Results   Component Value Date    RDW 18.2 02/19/2018     Lab Results   Component Value Date     02/19/2018     ASSESSMENT / PLAN:  (J18.1) Pneumonia of right lower lobe due to infectious organism (H)  (primary encounter diagnosis)  (B97.4) RSV (respiratory syncytial virus infection)  (T17.900D) Silent aspiration, subsequent encounter  Comment: no signs of infection. Tolerating nectar thick liquids, regular textured foods  Plan: continue SPEECH THERAPY, advance diet as tolerated.     (J44.1) COPD exacerbation (H)  Comment: exacerbation resolved   Plan: continue nebs    (I48.0) Paroxysmal atrial fibrillation (H)  Comment: rate controlled. INR supratherapeutic-no bleeding.   Plan: continue metoprolol. Hold coumadin X 2, then resume at 1 mg daily if INR <3.0     (I50.23) Acute on chronic systolic congestive heart failure (H)  Comment: compensated  Plan: volume management per dialysis. Daily weight    (N18.6,  Z99.2) ESRD (end stage renal disease) on dialysis (H)  Comment: chronic  [facility] Continue Renvela,  dialyvite.     (I10) Benign essential HTN  Comment: controlled  Plan: continue metoprolol, isosorbide. Monitor VS    (R19.7) Diarrhea, unspecified type  Comment: improved. C diff was negative  Plan: continue imodium  prn    (R53.81) Physical deconditioning  Comment: progressing in therapies. Discussed their concerns re: discharge plan and home services, concerns re: safety.   Plan: continue PHYSICAL THERAPY/OT. Anticipate discharge home next week with services.         Total time spent with patient visit at the skilled nursing facility was 43 mins including patient visit, review of past records and discussion with friend. Greater than 50% of total time spent with counseling and coordinating care due to complexity of care, unclear discharge plan    Electronically signed by  TONI Mckinney CNP

## 2018-03-04 PROBLEM — T17.900A SILENT ASPIRATION: Status: ACTIVE | Noted: 2018-01-01

## 2018-03-05 NOTE — PROGRESS NOTES
[facility]    HPI:    Amado Galarza is a 82 year old  (1935), who is being seen today for an episodic care visit at [facility].   HPI information obtained from: facility chart records, facility staff, patient report and [facility] chart review. Today's concern is INR/Coumadin management for A. Fib    Bleeding Signs/Symptoms:  None  Thromboembolic Signs/Symptoms:  None    Medication Changes:  No  Dietary Changes:  No  Activity Changes: No  Bacterial/Viral Infection:  No    Missed Coumadin Doses:  None    On ASA: No    Other Concerns:  No    OBJECTIVE:    INR Today:  2.8  Current Dose:  1 mg daily    ASSESSMENT:     Paroxysmal atrial fibrillation (H)  Chronic anticoagulation  Encounter for monitoring coumadin therapy  Therapeutic INR for goal of 2-3    PLAN:    New Dose: No Change      Next INR: 3/8/2018        Electronically signed by:  TONI Mckinney CNP

## 2018-03-08 NOTE — PROGRESS NOTES
Face to Face and Medical Necessity Statement for DME Provider visit    Demographic Information on Amado Galarza:  Gender: male  : 1935    [address] [place] [address]  [phone] (home)     Medical Record: [id number]  Social Security Number: [id number]  Primary Care Provider: System, Provider Not In  Insurance: Payor: MEDICARE / Plan: MEDICARE / Product Type: Medicare /     HPI:   Amado Galarza is a 82 year old  (1935), who is being seen today for a face to face provider visit at [facility]; medical necessity statement for DME included. This patient requires the following:  DME Ordered and Medical Necessity Statement   Nebulizer:  for albuterol medication    Pt needing above DME with expected length of need of 99 due to medical necessity associated with following diagnosis:     Pneumonia of right lower lobe due to infectious organism (H)  Silent aspiration, subsequent encounter  Acute respiratory failure with hypoxia (H)  COPD exacerbation (H)  RSV (respiratory syncytial virus infection)  Acute on chronic systolic congestive heart failure (H)  Paroxysmal atrial fibrillation (H)  ESRD (end stage renal disease) on dialysis (H)  PVD (peripheral vascular disease) (H)  Ankylosing spondylitis of multiple sites in spine (H)  Benign essential HTN  Delirium  Diarrhea, unspecified type  Physical deconditioning      PMH   has a past medical history of Anemia; Ankylosing spondylitis (H); Ascites; Atrial flutter (H); Benign essential HTN; Bilateral sensorineural hearing loss; Colon polyp; Congestive heart failure (CHF) (H); COPD (chronic obstructive pulmonary disease) (H); Ectropion; ESRF (end stage renal failure) (H); Gout; Heart failure (H); Hyperlipidemia; Hyperparathyroidism (H); Iron deficiency anemia; Obesity; Osteoarthritis, knee; Peripheral vascular disease (H); Renal artery obstruction (H); and Vertigo.  CURRENT MEDICATIONS  Current Outpatient Prescriptions   Medication  Sig Dispense Refill     EMOLLIENT EX Apply topically 2 times daily Apply to bilateral legs        Olodaterol HCl 2.5 MCG/ACT AERS Inhale 2 puffs into the lungs every morning       tiotropium-olodaterol (STIOLTO RESPIMAT) 2.5-2.5 MCG/ACT AERS Inhale 2 puffs into the lungs daily       ALLOPURINOL PO Take 50 mg by mouth daily       cyanocobalamin 1000 MCG TABS Take 1 tablet by mouth At Bedtime       B Complex-C-Folic Acid (DIALYVITE PO) Take 1 tablet by mouth At Bedtime       fluticasone (FLONASE) 50 MCG/ACT spray Spray 1 spray into both nostrils daily       guaiFENesin (ROBITUSSIN) 20 mg/mL SOLN solution Take 200 mg by mouth every 4 hours as needed for cough       ISOSORBIDE MONONITRATE PO Take 30 mg by mouth At Bedtime       MELATONIN PO Take 3 mg by mouth At Bedtime       METOPROLOL SUCCINATE ER PO Take 12.5 mg by mouth daily       mometasone (ASMANEX) 220 MCG/INH Inhaler Inhale 2 puffs into the lungs 2 times daily       OMEPRAZOLE PO Take 20 mg by mouth daily       sevelamer (RENVELA) 800 MG tablet Take 1,600 mg by mouth 3 times daily (with meals)       Warfarin Sodium (COUMADIN PO) Take 2 mg by mouth daily Tuesday, Thursday, Saturday  1mg daily on Sunday, Monday, Wednesday, Friday       ROS:4 point ROS including Respiratory, CV, GI and , other than that noted in the HPI,  is negative     EXAM  Vitals: /64  Pulse 91  Temp 97.6  F (36.4  C)  Resp 20  Wt 134 lb 9.6 oz (61.1 kg)  SpO2 98%  BMI 20.47 kg/m2;BMI= Body mass index is 20.47 kg/(m^2).  GENERAL APPEARANCE:  Alert, in no distress  ENT:  Mouth and posterior oropharynx normal, moist mucous membranes, [place]  EYES:  EOM normal, conjunctiva and lids normal  NECK:  No adenopathy,masses or thyromegaly  RESP:  respiratory effort and palpation of chest normal, no respiratory distress, clear to auscultation bilaterally, no wheezes or crackles  CV:  Palpation and auscultation of heart done , irregular rate and rhythm,  2/6 murmur, no  LE edema, unable to  palpate pedal pulses  ABDOMEN: soft, nontender, no hepatosplenomegaly or other masses  M/S:  gait steady with walker.  JUNG with good strength. No joint inflammation  SKIN:  no rashes or open areas. Dialysis catheter right chest site asymptomatic.    PSYCH:  oriented X3, normal judgement and memory, mood normal     ASSESSMENT/PLAN:  1. Pneumonia of right lower lobe due to infectious organism (H)    2. Silent aspiration, subsequent encounter    3. Acute respiratory failure with hypoxia (H)    4. COPD exacerbation (H)    5. RSV (respiratory syncytial virus infection)    6. Acute on chronic systolic congestive heart failure (H)    7. Paroxysmal atrial fibrillation (H)    8. ESRD (end stage renal disease) on dialysis (H)    9. PVD (peripheral vascular disease) (H)    10. Ankylosing spondylitis of multiple sites in spine (H)    11. Benign essential HTN    12. Delirium    13. Diarrhea, unspecified type    14. Physical deconditioning        Orders:  1. Facility staff/TC to contact DME company to get their order form for provider to fill out    ELECTRONICALLY SIGNED BY DUSTIN CERTIFIED PROVIDER:  TONI Mckinney CNP   NPI: [id number]  [facility]  [address] [place] [address], [address]  [place] [address]

## 2018-03-08 NOTE — PROGRESS NOTES
[facility] DISCHARGE SUMMARY    PATIENT'S NAME: Amado Galarza  YOB: 1935  MEDICAL RECORD NUMBER:  [id number]    PRIMARY CARE PROVIDER AND CLINIC RESPONSIBLE AFTER TRANSFER: [facility]  CODE STATUS/ADVANCE DIRECTIVES DISCUSSION:   DNR / DNI       Allergies   Allergen Reactions     Felodipine      Heparin        TRANSFERRING PROVIDERS: TONI Mckinney CNP, Hernan Delgado MD  DATE OF SNF ADMISSION:  February / 13 / 2018  DATE OF SNF (anticipated) DISCHARGE: March / 09 / 2018  DISCHARGE DISPOSITION: Non-[facility] Provider   Nursing Facility: [facility] stay 2/1/18 to 2/13/18.     Condition on Discharge:  Improving.  Cognitive Scores: SLUMS 24/30 and CPT 4.6/5.6    Equipment: walker    DISCHARGE DIAGNOSIS:   1. Pneumonia of right lower lobe due to infectious organism (H)    2. Silent aspiration, subsequent encounter    3. Acute respiratory failure with hypoxia (H)    4. COPD exacerbation (H)    5. RSV (respiratory syncytial virus infection)    6. Acute on chronic systolic congestive heart failure (H)    7. Paroxysmal atrial fibrillation (H)    8. ESRD (end stage renal disease) on dialysis (H)    9. PVD (peripheral vascular disease) (H)    10. Ankylosing spondylitis of multiple sites in spine (H)    11. Benign essential HTN    12. Delirium    13. Diarrhea, unspecified type    14. Physical deconditioning        HPI Nursing Facility Course:  HPI information obtained from: facility chart records, facility staff, patient report and [facility] chart review.He came to this facility for short term rehab and medical management following hospitalization at [facility]  for worsening shortness of breath and productive cough. He was positive for RSV. CT showed RLL consolidation and bilateral pleural effusions, R>L,  small to moderate abdominal ascites. He was treated with zosyn and levaquin, completed inpatient. Received 5 day course of prednisone for COPD  exacerbation. CHF exacerbation was managed with dialysis. LE edema and dyspnea improved. He had acute delirium with hallucinations while inpatient,  improved with trazodone at .     He has worked with PHYSICAL THERAPY and OT with good results. Ambulating 225 ft with walker and supervision. Requires stand by to min assist with bathing, independent with toileting and dressing. TUG 16.6, indicating normal balance.    ASSESSMENT / PLAN:  (J18.1) Pneumonia of right lower lobe due to infectious organism (H)  (primary encounter diagnosis)  (T17.900D) Silent aspiration, subsequent encounter  (J96.01) Acute respiratory failure with hypoxia (H)  (J44.1) COPD exacerbation (H)  (B97.4) RSV (respiratory syncytial virus infection)  Comment: respiratory status improved with no signs of infection. Continues to have a productive cough for clear sputum and shortness of breath at times. Nebs have been helpful.  He had an onsite scope by Speech Therapy and was found to have silent aspiration. Diet downgraded to regular foods with nectar thick liquids.   Plan: discharge home 3/9/2018. Continue albuterol nebs. Continue modified diet. Home services and follow up as below. He has a med  friend who is very involved in his care.     (I50.23) Acute on chronic systolic congestive heart failure (H)  Comment: compensated  Plan: volume management per dialysis. Daily weight    (I48.0) Paroxysmal atrial fibrillation (H)  Comment: rate controlled. INR slightly low today at 1.8  Plan: continue metoprolol. Coumadin 2 mg on Tues, Thurs, Sat and 1 mg the other days of the week. Homecare nurse to draw INR 3/12/2018 with results to PCP    (N18.6,  Z99.2) ESRD (end stage renal disease) on dialysis (H)  Comment: chronic  [facility] Continue Renvela, dialyvite.     (I73.9) PVD (peripheral vascular disease) (H)  Comment: chronic. Needs nailcare  Plan: [facility]    (M45.0) Ankylosing spondylitis of multiple sites in  spine (H)  Comment: chronic neck and back pain controlled  Plan: tylenol prn    (I10) Benign essential HTN  Comment: controlled, mild hypotension after dialysis with SBPs in the 90s   Plan: continue metoprolol, isosorbide.     (R41.0) Delirium  Comment: resolved. Cognitive status much improved and back to baseline mild deficits.   Plan: supportive care    (R19.7) Diarrhea, unspecified type  Comment: improved. C diff was  negative  Plan: monitor    (R53.81) Physical deconditioning  Comment: progress in therapies as above  Plan: home therapies for ongoing gait training, endurance and ADL safety      PAST MEDICAL HISTORY:  has a past medical history of Anemia; Ankylosing spondylitis (H); Ascites; Atrial flutter (H); Benign essential HTN; Bilateral sensorineural hearing loss; Colon polyp; Congestive heart failure (CHF) (H); COPD (chronic obstructive pulmonary disease) (H); Ectropion; ESRF (end stage renal failure) (H); Gout; Heart failure (H); Hyperlipidemia; Hyperparathyroidism (H); Iron deficiency anemia; Obesity; Osteoarthritis, knee; Peripheral vascular disease (H); Renal artery obstruction (H); and Vertigo.    DISCHARGE MEDICATIONS:  Current Outpatient Prescriptions   Medication Sig Dispense Refill     EMOLLIENT EX Apply topically 2 times daily Apply to bilateral legs        Olodaterol HCl 2.5 MCG/ACT AERS Inhale 2 puffs into the lungs every morning       tiotropium-olodaterol (STIOLTO RESPIMAT) 2.5-2.5 MCG/ACT AERS Inhale 2 puffs into the lungs daily       ALLOPURINOL PO Take 50 mg by mouth daily       cyanocobalamin 1000 MCG TABS Take 1 tablet by mouth At Bedtime       B Complex-C-Folic Acid (DIALYVITE PO) Take 1 tablet by mouth At Bedtime       fluticasone (FLONASE) 50 MCG/ACT spray Spray 1 spray into both nostrils daily       guaiFENesin (ROBITUSSIN) 20 mg/mL SOLN solution Take 200 mg by mouth every 4 hours as needed for cough       ISOSORBIDE MONONITRATE PO Take 30 mg by mouth At Bedtime       MELATONIN PO  Take 3 mg by mouth At Bedtime       METOPROLOL SUCCINATE ER PO Take 12.5 mg by mouth daily       mometasone (ASMANEX) 220 MCG/INH Inhaler Inhale 2 puffs into the lungs 2 times daily       OMEPRAZOLE PO Take 20 mg by mouth daily       sevelamer (RENVELA) 800 MG tablet Take 1,600 mg by mouth 3 times daily (with meals)       Warfarin Sodium (COUMADIN PO) Take 2 mg by mouth daily Tuesday, Thursday, Saturday  1mg daily on Sunday, Monday, Wednesday, Friday         MEDICATION CHANGES/RATIONALE:   Coumadin dosed for goal INR 2-3. Has been on 1-2 mg daily.   Controlled medications sent with patient:   not applicable/none     ROS:    10 point ROS of systems including Constitutional, Eyes, Respiratory, Cardiovascular, Gastroenterology, Genitourinary, Integumentary, Muscularskeletal, Psychiatric were all negative except for pertinent positives noted in my HPI.    Physical Exam:   Vitals: /64  Pulse 91  Temp 97.6  F (36.4  C)  Resp 20  Wt 134 lb 9.6 oz (61.1 kg)  SpO2 98%  BMI 20.47 kg/m2  BMI= Body mass index is 20.47 kg/(m^2).    GENERAL APPEARANCE:  Alert, in no distress  ENT:  Mouth and posterior oropharynx normal, moist mucous membranes, [place]  EYES:  EOM normal, conjunctiva and lids normal  NECK:  No adenopathy,masses or thyromegaly  RESP:  respiratory effort and palpation of chest normal, no respiratory distress, clear to auscultation bilaterally, no wheezes or crackles  CV:  Palpation and auscultation of heart done , irregular rate and rhythm,  2/6 murmur, no  LE edema, unable to palpate pedal pulses  ABDOMEN: soft, nontender, no hepatosplenomegaly or other masses  M/S:  gait steady with walker.  JUNG with good strength. No joint inflammation  SKIN:  no rashes or open areas. Dialysis catheter right chest site asymptomatic.    PSYCH:  oriented X3, normal judgement and memory, mood normal     DISCHARGE PLAN:  Occupational Therapy, Physical Therapy, Registered Nurse, Home Health Aide and SPEECH THERAPY  and From:   [facility]  Patient instructed to follow-up with:  PCP in 7 days      Current [facility] scheduled appointments:  No future appointments.    MTM referral needed and placed by this provider: No    Pending labs: None  SNF labs   Last Basic Metabolic Panel:  Lab Results   Component Value Date     02/19/2018      Lab Results   Component Value Date    POTASSIUM 5.8 02/19/2018     Lab Results   Component Value Date    CHLORIDE 99 02/19/2018     Lab Results   Component Value Date    CHRISTOPHE 9.5 02/19/2018     Lab Results   Component Value Date    CO2 24 02/19/2018     Lab Results   Component Value Date    BUN 66 02/19/2018     Lab Results   Component Value Date    CR 5.4 02/19/2018     Lab Results   Component Value Date    GLC 98 02/19/2018     Lab Results   Component Value Date    WBC 11.45 02/19/2018     Lab Results   Component Value Date    RBC 3.98 02/19/2018     Lab Results   Component Value Date    HGB 12.8 02/19/2018     Lab Results   Component Value Date    HCT 38.7 02/19/2018     Lab Results   Component Value Date    MCV 97.2 02/19/2018     Lab Results   Component Value Date    MCH 32.2 02/19/2018     Lab Results   Component Value Date    MCHC 33.1 02/19/2018     Lab Results   Component Value Date    RDW 18.2 02/19/2018     Lab Results   Component Value Date     02/19/2018     Discharge Treatments: Albuterol neb    TOTAL DISCHARGE TIME:   Greater than 30 minutes  Electronically signed by:  TONI Mckinney CNP

## 2024-06-17 PROBLEM — Z71.89 ADVANCED DIRECTIVES, COUNSELING/DISCUSSION: Status: RESOLVED | Noted: 2018-01-01 | Resolved: 2024-06-17
